# Patient Record
(demographics unavailable — no encounter records)

---

## 2024-12-10 NOTE — LETTER BODY
[Dear  ___] : Dear  [unfilled], [Consult Letter:] : I had the pleasure of evaluating your patient, [unfilled]. [Please see my note below.] : Please see my note below. [Consult Closing:] : Thank you very much for allowing me to participate in the care of this patient.  If you have any questions, please do not hesitate to contact me. [FreeTextEntry1] : Please see my note. I will keep you informed. [FreeTextEntry3] : Sincerely,  Jennifer Jean-Baptiste MD Clinical  [facility]

## 2024-12-10 NOTE — ADDENDUM
[FreeTextEntry1] : This note was partly authored by Jamie Mckay working as a scribe for MARIA DEL CARMEN Elliott. I, MARIA DEL CARMEN Elliott, have reviewed the content of this note and confirm it is true and accurate. I personally performed the history and physical examination and made all the decisions. 12/09/2024.

## 2024-12-10 NOTE — ASSESSMENT
[FreeTextEntry1] : Urine sent for UA w Reflex Culture, and Cytology  Pt will provide blood for Cr/BUN, and PSA screen.   Finasteride 5 mg x3 weekly, and Tamsulosin HCl 0.4 mg daily Renewed.   Pt will be called w results. His Cr has risen slightly yet he doesn't return. Unless there is an obvious reason, we may consider intervention for his prostate. Can also consider urodynamics to assess his filling and emptying pressures to see if high pressures are leading to renal deterioration. US kidney and bladder will be ordered.

## 2024-12-10 NOTE — LETTER BODY
[Dear  ___] : Dear  [unfilled], [Consult Letter:] : I had the pleasure of evaluating your patient, [unfilled]. [Please see my note below.] : Please see my note below. [Consult Closing:] : Thank you very much for allowing me to participate in the care of this patient.  If you have any questions, please do not hesitate to contact me. [FreeTextEntry1] : Please see my note. I will keep you informed. [FreeTextEntry3] : Sincerely,  Jennifer Jean-Baptiste MD Clinical  [facility]

## 2024-12-10 NOTE — HISTORY OF PRESENT ILLNESS
[FreeTextEntry1] : 12/09/2024:  Mr. LAMA is a 71-year-old male with h/o Gh and TURBT on 11/27/23 that was benign, and h/o elevated PSA Patient had subsequent Cysto on 3/11/24 that showed enlarged prostate, but NO tumors.    Pt's PSA was found elevated (5.78 ng/mL) at 3/11/24. He was since started on finasteride 5 mg x3 weekly; and Tamsulosin HCl 0.4 mg one daily.  Repeat PSA was 0.65 on finasteride on 6/10/24 (true value 1.3)  Pt returns today for a follow up.  Urinary-wise, patient voids about x5 during daytime. He continues finasteride and Tamsulosin tolerating them well.  His stream is now reportedly better and has no urinary complaints today.  No incontinence.   -Cr was 1.78 w eGFR 40 on 6/10/24  (previously 1.46 w eGFR 51 on March 2024)